# Patient Record
(demographics unavailable — no encounter records)

---

## 2024-10-21 NOTE — PHYSICAL EXAM
[Chaperone Declined] : Patient declined chaperone [Awake] : awake [Alert] : alert [Acute Distress] : no acute distress [Mass] : no breast mass [Nipple Discharge] : no nipple discharge [Axillary LAD] : no axillary lymphadenopathy [Soft] : soft [Tender] : non tender [Distended] : not distended [H/Smegaly] : no hepatosplenomegaly [Oriented x3] : oriented to person, place, and time [Depressed Mood] : not depressed [Flat Affect] : affect not flat [Normal] : uterus [Labia Majora] : labia major [Labia Minora] : labia minora [No Bleeding] : there was no active vaginal bleeding [Pap Obtained] : a Pap smear was performed [Motion Tenderness] : there was no cervical motion tenderness [Normal Position] : in a normal position [Uterine Adnexae] : were not tender and not enlarged [[email or web address]] : no fullness, tenderness or masses on exam

## 2024-10-21 NOTE — HISTORY OF PRESENT ILLNESS
[Good] : being in good health [Healthy Diet] : a healthy diet [Regular Exercise] : regular exercise [Weight Concerns] : no concerns with her weight [Reproductive Age] : is of reproductive age [Menstrual Problems] : reports abnormal menses [Menarche Age ____] : age at menarche was [unfilled] [Irregular Cycle Intervals] : are  irregular [Missed Most Recent Period] : missed the most recent period [Up to Date] : not up to date with ~his/her~ STD screening [HPV Vaccine ___] : HPV vaccine [unfilled] [Approximately ___ (Month)] : the LMP was approximately [unfilled] month(s) ago [Normal Amount/Duration] : was of a normal amount and duration [Spotting Between  Menses] : no spotting between menses [Regular Cycle Intervals] : periods have been irregular [Menstrual Cramps] : no menstrual cramps [Sexually Active] : is sexually active [Contraception] : does not use contraception